# Patient Record
Sex: FEMALE | Race: OTHER | NOT HISPANIC OR LATINO | URBAN - METROPOLITAN AREA
[De-identification: names, ages, dates, MRNs, and addresses within clinical notes are randomized per-mention and may not be internally consistent; named-entity substitution may affect disease eponyms.]

---

## 2022-08-30 ENCOUNTER — NEW PATIENT (OUTPATIENT)
Dept: URBAN - METROPOLITAN AREA CLINIC 9 | Facility: CLINIC | Age: 72
End: 2022-08-30

## 2022-08-30 DIAGNOSIS — H16.223: ICD-10-CM

## 2022-08-30 DIAGNOSIS — H52.223: ICD-10-CM

## 2022-08-30 DIAGNOSIS — H04.123: ICD-10-CM

## 2022-08-30 PROCEDURE — 92015 DETERMINE REFRACTIVE STATE: CPT

## 2022-08-30 PROCEDURE — 92004 COMPRE OPH EXAM NEW PT 1/>: CPT

## 2022-08-30 ASSESSMENT — KERATOMETRY
OS_K1POWER_DIOPTERS: 42.25
OD_AXISANGLE2_DEGREES: 161
OS_AXISANGLE_DEGREES: 107
OD_K2POWER_DIOPTERS: 42.50
OD_K1POWER_DIOPTERS: 42.25
OS_K2POWER_DIOPTERS: 42.50
OD_AXISANGLE_DEGREES: 71
OS_AXISANGLE2_DEGREES: 17

## 2022-08-30 ASSESSMENT — TONOMETRY
OS_IOP_MMHG: 16
OD_IOP_MMHG: 16

## 2022-08-30 ASSESSMENT — VISUAL ACUITY
OU_CC: 20/30-2
OD_GLARE: 20/50
OS_GLARE: 20/40
OS_CC: J1
OU_CC: J1
OD_CC: 20/40
OS_CC: 20/30-2
OD_CC: J1

## 2022-11-07 NOTE — PATIENT DISCUSSION
Tobradex drops use QID for four days then taper down to BID for four days OD. send it into [employer] at [facility] on [address].